# Patient Record
Sex: MALE | Race: WHITE
[De-identification: names, ages, dates, MRNs, and addresses within clinical notes are randomized per-mention and may not be internally consistent; named-entity substitution may affect disease eponyms.]

---

## 2019-12-08 ENCOUNTER — HOSPITAL ENCOUNTER (EMERGENCY)
Dept: HOSPITAL 47 - EC | Age: 29
Discharge: HOME | End: 2019-12-08
Payer: COMMERCIAL

## 2019-12-08 VITALS — TEMPERATURE: 97.9 F | SYSTOLIC BLOOD PRESSURE: 171 MMHG | RESPIRATION RATE: 20 BRPM | DIASTOLIC BLOOD PRESSURE: 116 MMHG

## 2019-12-08 VITALS — HEART RATE: 97 BPM

## 2019-12-08 DIAGNOSIS — F10.129: Primary | ICD-10-CM

## 2019-12-08 DIAGNOSIS — F32.9: ICD-10-CM

## 2019-12-08 LAB
ALBUMIN SERPL-MCNC: 4.5 G/DL (ref 3.5–5)
ALP SERPL-CCNC: 57 U/L (ref 38–126)
ALT SERPL-CCNC: 44 U/L (ref 21–72)
ANION GAP SERPL CALC-SCNC: 11 MMOL/L
AST SERPL-CCNC: 36 U/L (ref 17–59)
BASOPHILS # BLD AUTO: 0 K/UL (ref 0–0.2)
BASOPHILS NFR BLD AUTO: 1 %
BUN SERPL-SCNC: 17 MG/DL (ref 9–20)
CALCIUM SPEC-MCNC: 9.1 MG/DL (ref 8.4–10.2)
CHLORIDE SERPL-SCNC: 109 MMOL/L (ref 98–107)
CO2 SERPL-SCNC: 23 MMOL/L (ref 22–30)
EOSINOPHIL # BLD AUTO: 0.2 K/UL (ref 0–0.7)
EOSINOPHIL NFR BLD AUTO: 3 %
ERYTHROCYTE [DISTWIDTH] IN BLOOD BY AUTOMATED COUNT: 4.71 M/UL (ref 4.3–5.9)
ERYTHROCYTE [DISTWIDTH] IN BLOOD: 13.3 % (ref 11.5–15.5)
GLUCOSE SERPL-MCNC: 135 MG/DL (ref 74–99)
HCT VFR BLD AUTO: 42.2 % (ref 39–53)
HGB BLD-MCNC: 14.7 GM/DL (ref 13–17.5)
LYMPHOCYTES # SPEC AUTO: 3.3 K/UL (ref 1–4.8)
LYMPHOCYTES NFR SPEC AUTO: 42 %
MCH RBC QN AUTO: 31.1 PG (ref 25–35)
MCHC RBC AUTO-ENTMCNC: 34.8 G/DL (ref 31–37)
MCV RBC AUTO: 89.6 FL (ref 80–100)
MONOCYTES # BLD AUTO: 0.4 K/UL (ref 0–1)
MONOCYTES NFR BLD AUTO: 5 %
NEUTROPHILS # BLD AUTO: 3.8 K/UL (ref 1.3–7.7)
NEUTROPHILS NFR BLD AUTO: 48 %
PLATELET # BLD AUTO: 268 K/UL (ref 150–450)
POTASSIUM SERPL-SCNC: 3.8 MMOL/L (ref 3.5–5.1)
PROT SERPL-MCNC: 7.2 G/DL (ref 6.3–8.2)
SODIUM SERPL-SCNC: 143 MMOL/L (ref 137–145)
WBC # BLD AUTO: 7.8 K/UL (ref 3.8–10.6)

## 2019-12-08 PROCEDURE — 85025 COMPLETE CBC W/AUTO DIFF WBC: CPT

## 2019-12-08 PROCEDURE — 80053 COMPREHEN METABOLIC PANEL: CPT

## 2019-12-08 PROCEDURE — 96372 THER/PROPH/DIAG INJ SC/IM: CPT

## 2019-12-08 PROCEDURE — 80320 DRUG SCREEN QUANTALCOHOLS: CPT

## 2019-12-08 PROCEDURE — 36415 COLL VENOUS BLD VENIPUNCTURE: CPT

## 2019-12-08 PROCEDURE — 99284 EMERGENCY DEPT VISIT MOD MDM: CPT

## 2019-12-08 PROCEDURE — 83690 ASSAY OF LIPASE: CPT

## 2019-12-08 NOTE — ED
Alcohol HPI





- General


Chief Complaint: Alcohol


Stated Complaint: ETOH


Time Seen by Provider: 12/08/19 02:30


Source: patient, family


Limitations: no limitations





- History of Present Illness


Initial Comments: 


Ramon is a 29-year-old gentleman is brought to the emergency department today for

evaluation of severe alcohol intoxication.  Patient is accompanied by his best 

friend and his wife who report the patient's been drinking whiskey heavily 

throughout the night.  He is very intoxicated, tearful, agitated.  Family and 

friends were concerned about the ability to control the patient's of the problem

the ER for evaluation.








- Related Data


                                    Allergies











Allergy/AdvReac Type Severity Reaction Status Date / Time


 


No Known Allergies Allergy   Verified 12/08/19 02:30














Review of Systems


ROS Statement: 


Those systems with pertinent positive or pertinent negative responses have been 

documented in the HPI.





ROS Other: All systems not noted in ROS Statement are negative.





Past Medical History


Past Medical History: No Reported History


Additional Past Surgical History / Comment(s): testicular right


Past Psychological History: Depression


Smoking Status: Never smoker


Past Alcohol Use History: Heavy


Past Drug Use History: None Reported





General Exam





- General Exam Comments


Initial Comments: 


Physical Exam


GENERAL:


Patient is well-developed and well-nourished.  


Patient is nontoxic and well-hydrated and is in no distress.





HENT:


Normocephalic, Atraumatic. 





EYES:


PERRL, EOMI





PULMONARY:


Unlabored respirations.  





CARDIOVASCULAR:


RRR


Warm and well perfused extremities





ABDOMEN:


Non-distended





SKIN:


No rashes or bruising 





: 


Deferred





NEUROLOGIC:


Alert and oriented


Normal speech


Normal gait





MUSCULOSKELETAL:


Moving all extremities with no apparent injury 





PSYCHIATRIC:


Tearful, crying


Easily agitated


Limitations: no limitations





Course


                                   Vital Signs











  12/08/19





  02:27


 


Temperature 97.9 F


 


Pulse Rate 102 H


 


Respiratory 20





Rate 


 


Blood Pressure 171/116


 


O2 Sat by Pulse 98





Oximetry 














Procedures





- Universal Protocol (Time Out)


Nurse: Carmen Galindo





Medical Decision Making





- Medical Decision Making


She was seen and evaluated, patient was very agitated upon arrival however is 

redirectable by his friend at bedside


Patient would not participate with breath alcohol test by consented to an IV 

blood was obtained


IV Ativan was ordered and administered


Patient admits is just very intoxicated and upset





Patient noted to be sleeping soundly after Ativan.  Patient care was discussed 

with the wife and patient's mother at bedside, neither would like to petition 

the patient they do not feel he is homicidal, suicidal or a danger to himself or

others they just state that things got out of hand because of how much was he 

drink tonight.  They're comfortable with the plan for discharge home when he is 

more awake.








- Lab Data


Result diagrams: 


                                 12/08/19 02:45





                                 12/08/19 02:45


                                   Lab Results











  12/08/19 12/08/19 Range/Units





  02:45 02:45 


 


WBC   7.8  (3.8-10.6)  k/uL


 


RBC   4.71  (4.30-5.90)  m/uL


 


Hgb   14.7  (13.0-17.5)  gm/dL


 


Hct   42.2  (39.0-53.0)  %


 


MCV   89.6  (80.0-100.0)  fL


 


MCH   31.1  (25.0-35.0)  pg


 


MCHC   34.8  (31.0-37.0)  g/dL


 


RDW   13.3  (11.5-15.5)  %


 


Plt Count   268  (150-450)  k/uL


 


Neutrophils %   48  %


 


Lymphocytes %   42  %


 


Monocytes %   5  %


 


Eosinophils %   3  %


 


Basophils %   1  %


 


Neutrophils #   3.8  (1.3-7.7)  k/uL


 


Lymphocytes #   3.3  (1.0-4.8)  k/uL


 


Monocytes #   0.4  (0-1.0)  k/uL


 


Eosinophils #   0.2  (0-0.7)  k/uL


 


Basophils #   0.0  (0-0.2)  k/uL


 


Sodium  143   (137-145)  mmol/L


 


Potassium  3.8   (3.5-5.1)  mmol/L


 


Chloride  109 H   ()  mmol/L


 


Carbon Dioxide  23   (22-30)  mmol/L


 


Anion Gap  11   mmol/L


 


BUN  17   (9-20)  mg/dL


 


Creatinine  1.36 H   (0.66-1.25)  mg/dL


 


Est GFR (CKD-EPI)AfAm  82   (>60 ml/min/1.73 sqM)  


 


Est GFR (CKD-EPI)NonAf  71   (>60 ml/min/1.73 sqM)  


 


Glucose  135 H   (74-99)  mg/dL


 


Calcium  9.1   (8.4-10.2)  mg/dL


 


Total Bilirubin  0.3   (0.2-1.3)  mg/dL


 


AST  36   (17-59)  U/L


 


ALT  44   (21-72)  U/L


 


Alkaline Phosphatase  57   ()  U/L


 


Total Protein  7.2   (6.3-8.2)  g/dL


 


Albumin  4.5   (3.5-5.0)  g/dL


 


Lipase  92   ()  U/L


 


Serum Alcohol  272 H*   mg/dL














Disposition


Clinical Impression: 


 Alcoholic intoxication





Disposition: HOME SELF-CARE


Condition: Stable


Instructions (If sedation given, give patient instructions):  Alcohol 

Intoxication (ED)


Is patient prescribed a controlled substance at d/c from ED?: No


Referrals: 


None,Stated [REFERRING] - 1-2 days